# Patient Record
Sex: FEMALE
[De-identification: names, ages, dates, MRNs, and addresses within clinical notes are randomized per-mention and may not be internally consistent; named-entity substitution may affect disease eponyms.]

---

## 2021-06-10 ENCOUNTER — HOSPITAL ENCOUNTER (INPATIENT)
Dept: HOSPITAL 41 - JD.OB | Age: 31
LOS: 6 days | Discharge: HOME | DRG: 540 | End: 2021-06-16
Attending: OBSTETRICS & GYNECOLOGY | Admitting: OBSTETRICS & GYNECOLOGY
Payer: COMMERCIAL

## 2021-06-10 DIAGNOSIS — Z3A.40: ICD-10-CM

## 2021-06-10 DIAGNOSIS — D62: ICD-10-CM

## 2021-06-10 DIAGNOSIS — J45.909: ICD-10-CM

## 2021-06-10 DIAGNOSIS — K21.9: ICD-10-CM

## 2021-06-10 DIAGNOSIS — Z20.822: ICD-10-CM

## 2021-06-10 DIAGNOSIS — E66.9: ICD-10-CM

## 2021-06-10 PROCEDURE — U0002 COVID-19 LAB TEST NON-CDC: HCPCS

## 2021-06-10 NOTE — PCM.LDHP
L&D History of Present Illness





- General


Date of Service: 06/10/21


Admit Problem/Dx: 


                           Admission Diagnosis/Problem











Admission Diagnosis/Problem    














06/10/21 07:53


Maulik is a 30-year-old  1 para 0 female admitted on 6/10/2021 at 40-0/7 

weeks gestational age with an FAITH of 6/10/2021 for induction of labor.


Source of Information: Patient


History Limitations: Reports: No Limitations





- History of Present Illness


Introduction:: 





Maulik is a 30-year-old  1 para 0 female admitted on 6/10/2021 at 40-0/7 

weeks gestational age with an FAITH of 6/10/2021 for induction of labor.  The 

procedure, process, expectations and limitations of induction of labor discussed

in detail with patient.  She appears understand and wishes to proceed.





OB/GYN history: Maulik is a  1 para 0.  She had menarche at age 11.  

Cycles regular.  Last menstrual period started on 9/3/2020 and was definite.  

Her pregnancy is dated by this last menstrual period and supported by at least 3

ultrasounds.  Patient had a positive hCG on 2020.  She is not using any 

birth control at the time of conception.  She has had no abnormal Pap smears or 

STIs in the past.





Prenatal course.  Initial first prenatal visit was with a very early ultrasound 

done on 10/26/2020 at 7-3/7 weeks gestational age.  She was seen on a very 

regular basis throughout the pregnancy.  Her vital signs remained stable 

throughout the pregnancy.  Her weight gain was from 250.2 to 270.8 pounds.  

Fundal height growth was appropriate.  Patient has a history of asthma which has

been relatively stable during the pregnancy.  She desires epidural.  She has a 

family history of factor V Leiden mutation but has tested negative for this.  

Her group B strep screen was negative.  Risk factors include increased weight 

and distance from the hospital as she lives in [place], [place].  Prequel 

noninvasive prenatal screen done after 10 weeks was negative for trisomy 18, 13 

and 21.  Gender identification consistent with female.  She did have a urinary 

tract infection during pregnancy.  Patient received her Tdap on 5/10/2021.  She 

is rubella immune.





Laboratory testing during pregnancy shows blood to be all positive with a 

negative antibody screen.  Hemoglobin is 12.7 g/dL and platelets are 338,000.  

She is rubella immune.  RPR is nonreactive.  Urine culture was negative.  

Hepatitis B surface antigen and HIV assays were both negative.  Chlamydia and 

gonorrhea tests were negative.  TSH done on 2021 prior to the pregnancy was

negative for abnormalities.  Second trimester labs showed a hemoglobin of 11.7 

g/dL.  She was started on ferrous sulfate 325 mg p.o. daily at that time.  Her 

platelet count was 307,000.  Her 1 hour GTT was normal.  RPR was nonreactive.  

Group B strep screen was negative.





Allergies: Seasonal allergies only





Medications:





1.  Omeprazole 40 mg p.o. daily as needed for dyspepsia





2.  Montelukast sodium 10 mg p.o. daily for asthma





3.  Prenatal vitamin 1 p.o. daily.





4.  Advair Diskus inhaler 250-50 mcg per dose inhaler nation aerosol Powder 

breath activated





5.  Ferrous sulfate 3 and 25 mg p.o. daily





6.  Zyrtec 10 mg p.o. daily.





Past medical history:





1.  Asthma.





2.  Seasonal allergies.





Past surgical history:





1.  [id number]





2.  ACL knee surgery 





Family history: Mother is alive and well.  Father is alive but with hypertension

on medications.  2 brothers alive and well but 1 with ADHD.  Maternal 

grandmother  secondary to COPD.  Maternal grandfather  secondary

to COPD.  Both were smokers.  Paternal grandmother alive and well but with 

hypertension.  Paternal grandfather  secondary to lung cancerwas a 

smoker.  There is a maternal family history of a maternal aunt and multiple 

cousins on her maternal side that have factor V Leiden mutation.  Patient has 

been evaluated with factor V Leiden evaluation it which has returned negative.  

There are no bleeding, pregnancy related problems in the family.  A maternal 

aunt was noted to have breast cancer in her 50s.





Social history: Patient is .  She is a [employer].  She lives in 

[place], [place].  She is a college graduate.   is [employer].  She does not

use any significant also alcohol, drugs or tobacco.





Review of systems: In general patient has no complaints.  Baby has been active.





Skin: Negative





Lungs: No infectious symptoms or shortness of breath





Cardiovascular: No chest pain or exercise intolerance





Breasts: No lumps, changes in size, pain, dimpling, discharge or axillary or 

supraclavicular concerns.  Changes present that are associated with pregnancy.





GI: Negative





: Pregnancy changes in body habitus noted.





Musculoskeletal: Negative





Neurological: Negative





Physical exam:





In general the patient is well-developed, well-nourished, pleasant female of 

stated age in no acute distress.  Blood pressure on last evaluation clinic was 

122/64.  Weight was 270.8 pounds with first prenatal weight noted at 250.2 

pounds.  Height is 5 feet 6 inches.  Prepregnancy body mass index was 39.5.





Skin is warm dry without lesions.





HEENT, neck and back within normal limits.





Lungs are clear with good breath sounds in all lung fields.





Cardiovascular exam shows regular and rhythm without murmurs.





Breast exam was done at first prenatal visit and found to be normal and is not 

repeated at the time of this admission.





Abdomen is gravid with last fundal height at 41 cm.  Baby in vertex presentation

by Leopold maneuvers and by cervical exam.





Genital per digital exam at last visit shows cervix to be 1 to 2 cm, 70% 

effaced, very soft, -3 station.





Extremities and neurological exam are grossly within normal limits.





H&P Review of Systems





- Review of Systems:


Review Of Systems: See Below





L&D Exam





- Exam


Exam: See Below





- Problem List


(1) 40 weeks gestation of pregnancy


SNOMED Code(s): 83364703


   ICD Code: Z3A.40 - 40 WEEKS GESTATION OF PREGNANCY   Status: Acute   





(2) Obesity (BMI 35.0-39.9 without comorbidity)


SNOMED Code(s): 632464566, 566702835


   ICD Code: E66.9 - OBESITY, UNSPECIFIED   Status: Acute   


Problem List Initiated/Reviewed/Updated: Yes


Assessment/Plan Comment:: 





1.  Maulik is a 30-year-old  1 para 0 female admitted on 6/10/2021 at 40-

0/7 weeks gestational age with an FAITH of 6/10/2021 for induction of labor.





2.  Risk factors for the pregnancy include increased distance from the hospital,

obesity, asthma





3.  Group B strep negative





4.  Patient plans to breast-feed.





5.  Patient desires epidural in labor





6.  Patient has received her Tdap during pregnancy.  She is rubella immune.





Plan:





1.  Cytotec cervical ripening initially: We will give 25 mcg dose first dose 

vaginally followed by at least 23 doses of 50 mcg vaginally on a every 3 hour 

basis.  Will monitor her asthma very closely.  She is to continue on her asthma 

medications at this time.  The risks of Cytotec with history of asthma are 

discussed with the patient.  She has consented to attempting Cytotec cervical 

ripening after discussion of potential risks and benefits in light of her 

history of asthma in pregnancy.  This cervical ripening will be followed by 

Pitocin induction with possible AROM as possible.





2.  IV access prior to Cytotec placement





3.  Electronic fetal monitoring of labor per protocol





4.  Epidural per patient desire for labor analgesia.





5.  Anticipate 





6.  Support breast-feeding decision





7.  Admission laboratory testing consist of Covid19 testing, CBC, RPR per 

protocol.

## 2021-06-13 RX ADMIN — EPHEDRINE SULFATE PRN MG: 50 INJECTION, SOLUTION INTRAVENOUS at 17:13

## 2021-06-13 RX ADMIN — Medication SCH MLS/HR: at 20:25

## 2021-06-13 RX ADMIN — EPHEDRINE SULFATE PRN MG: 50 INJECTION, SOLUTION INTRAVENOUS at 17:32

## 2021-06-13 RX ADMIN — EPHEDRINE SULFATE PRN MG: 50 INJECTION, SOLUTION INTRAVENOUS at 17:09

## 2021-06-13 RX ADMIN — Medication SCH MLS/HR: at 06:26

## 2021-06-13 RX ADMIN — Medication PRN ML: at 16:01

## 2021-06-13 NOTE — PCM.SN.2
- Free Text/Narrative


Note: 





Progress note:





Patient was found to be 2 cm, 80% effaced, -3 station, cephalic presentation, 

mid position on last evaluation last evening.  Patient did not tolerate the exam

well for a number of reasons.  Soft tissue presents secondary to patient's body 

habitus seems to be associated with the very difficult nature of her exam.  With

Pitocin increased to 30milliunits/min patient did achieve a good labor pattern 

with moderate contractions every 3 minutes.





Hospital course.  Last evening the Pitocin was discontinued to allow patient to 

rest.  At approximately 0100 hrs. on 2021 patient was given a dose of 

Cytotec 25 mcg vaginally.  At 4:00 she is given a second dose of [id number] mcg.

 With this she has had some uterine irritability.  Fetal heart tones are 

reassuring.  No formal contractions are seen.





Assessment:





1.  40-3/7-week intrauterine pregnancy, slow progression of cervical dilation.





2.  Difficult and uncomfortable exam secondary to soft tissue 

dystocia/discomfort secondary to patient's body habitus





3.  Generally reassuring  testing





4.  Patient desiring epidural at some point





Plan:





1.  We will wait with any further cervical exams or attempts at rupture 

membranes until after patient is in a good labor pattern and has obtained 

epidural analgesia.





2.  Continue intermittent monitoring of fetal heart tones





3.  Reassurance

## 2021-06-13 NOTE — PCM.SN.2
- Free Text/Narrative


Note: 


Procedure: PIV start


Start: 1712


Stop: 1732





Called by OB RN to start PIV. She attempted to place x2 without success. U

ltrasound utilized with successful attempt with long 20 phyllis to right upper arm.

Area localized with 0.2 ml of 1% lidocaine. Patient tolerated well. 





Amina Arauz CRNA

## 2021-06-14 PROCEDURE — 3E0P7VZ INTRODUCTION OF HORMONE INTO FEMALE REPRODUCTIVE, VIA NATURAL OR ARTIFICIAL OPENING: ICD-10-PCS | Performed by: OBSTETRICS & GYNECOLOGY

## 2021-06-14 PROCEDURE — 10H07YZ INSERTION OF OTHER DEVICE INTO PRODUCTS OF CONCEPTION, VIA NATURAL OR ARTIFICIAL OPENING: ICD-10-PCS | Performed by: OBSTETRICS & GYNECOLOGY

## 2021-06-14 PROCEDURE — 3E0R3BZ INTRODUCTION OF ANESTHETIC AGENT INTO SPINAL CANAL, PERCUTANEOUS APPROACH: ICD-10-PCS | Performed by: OBSTETRICS & GYNECOLOGY

## 2021-06-14 RX ADMIN — OXYCODONE HYDROCHLORIDE AND ACETAMINOPHEN PRN TAB: 5; 325 TABLET ORAL at 18:05

## 2021-06-14 RX ADMIN — VITAMIN A, ASCORBIC ACID, CHOLECALCIFEROL, .ALPHA.-TOCOPHEROL ACETATE, DL-, THIAMINE MONONITRATE, RIBOFLAVIN, NIACINAMIDE, PYRIDOXINE HYDROCHLORIDE, FOLIC ACID, CYANOCOBALAMIN, CALCIUM CARBONATE, IRON, ZINC OXIDE, AND CUPRIC OXIDE SCH: 4000; 120; 400; 22; 1.84; 3; 20; 10; 1; 12; 200; 29; 25; 2 TABLET ORAL at 14:32

## 2021-06-14 RX ADMIN — Medication PRN ML: at 02:17

## 2021-06-14 NOTE — PCM.SN.2
- Free Text/Narrative


Note: 





Progress note:





Maulik has been in reasonably good labor objectively evaluated with internal 

pressure catheter for approximately the last 12 hours since SROM.  SROM occurred

with resultant clear amniotic fluid.  Fetal heart tones are reassuring.  Vital 

signs are stable.  Patient is comfortable with epidural in place and has been 

able to rest.





Evaluation patient cervix reveals 5 cm dilation, -3 station, 90% effacement, 

soft, mid position.  Patient tolerated exam well.  Patient still on Pitocin 

augmentation with reasonably strong contractions occurring every 3 minutes.





Assessment: 40-4/7-week intrauterine pregnancy, very slow progression of labor 

despite intrauterine pressure catheter objectively adequate labor. 





Plan:





1.  Monitor for just a short period time and if no progress is noted will 

consider a primary  section.  The procedure, risk, benefits, continued 

attempt at vaginal delivery all discussed with patient.  She appears understand 

and is in agreement.





2.   labs to be obtained





3.  Continue monitoring until final decision is made.

## 2021-06-14 NOTE — PCM.POSTAN
POST ANESTHESIA ASSESSMENT





- MENTAL STATUS


Mental Status: Alert, Oriented





- VITAL SIGNS


Vital Signs: 


                                Last Vital Signs











Temp  37.6 C   06/12/21 08:21


 


Pulse  96   06/12/21 08:21


 


Resp  14   06/12/21 08:21


 


BP  124/86   06/12/21 08:21


 


Pulse Ox      








0705


86/62


78


23


97.2


23





- RESPIRATORY


Respiratory Status: Respiratory Rate WNL, Airway Patent, O2 Saturation Stable





- CARDIOVASCULAR


CV Status: Pulse Rate WNL, Blood Pressure Stable





- GASTROINTESTINAL


GI Status: No Symptoms





- PAIN


Pain Score: 0





- POST OP HYDRATION


Hydration Status: Adequate & Stable

## 2021-06-14 NOTE — PCM.SN.2
- Free Text/Narrative


Note: 





I was called concerning post operative vaginal bleeding and uterine fundal 

height above the umbilicus.  Nurses expressed concern about potential retained 

clots leading to lack of contraction/involution of the uterus and continued 

postoperative bleeding after  section done this AM.  Patient had an 

extended labor.  She had reasonably good uterine contractility after delivery 

with the assistance of Methergine 0.2 mg IM and IV Pitocin.





On evaluation patient is having her blood drawn.  Her pulse was 122.  Blood 

pressure is good.  O2 saturations were 97-98% on room air.  Patient was feeling 

fine.  Was beginning to have some discomfort coming back after the epidural has 

begun wearing off.





Incision appears to be dry and intact.  No evidence of abdominal hematoma.  

Initially uterus was approximately 3 to 4 cm above the umbilicus.  Patient's 

evaluation was very difficult because of her body habitus and her increased 

weight.  Nurses reported she had probably about 900-1000 ml of blood loss in the

PACU and since returning to the floor.  A clot was previously expressed and she 

had some on bright red blood noted on repeated chux that were changed.





Vaginal exam was performed and with this couple clots were expressed.  Probably 

another 300 cc total.  With this uterine fundal height returned to about the 

level of the umbilicus.  Patient previously given 400 mcg of Cytotec buccally.





Assessment:





1.  Vaginal bleeding after  section this morning.  Uterine enlargement 

extending to above the umbilicus with reduction in size after expression of 

clots vaginally.  Findings are probably consistent with some uterine atony which

patient is at high risk for because of her extended labor.





2.  Prolonged labor course with induction over 2 days with resultant failure to 

progress and  done the same.





3.  Urine output was approximately 125-150 mL in the PACU.  Has been approximate

25 mL over the last hour upon return to the floor.  The blood is somewhat blood-

tinged.





4.  Patient's pain control is adequate at this time.





Plan:





1.  Tranexamic acid 1 g to be given IV over 10 minutes





2.  Methergine 0.2 mg IM to be given now





3.  Obtain labs consist of CBC, coags, comprehensive metabolic profile





4.  We will continue to monitor bleeding





5.  We will type, crossmatch and transfuse as indicated by blood count and 

continued bleeding





6.  If bleeding continues will consider doing an ultrasound to evaluate for 

other potential causes of blood loss such as a hematoma





7.  Continue monitoring urine output, vital signs and O2 sat.

## 2021-06-14 NOTE — PCM.OPNOTE
- General Post-Op/Procedure Note


Date of Surgery/Procedure: 21


Operative Procedure(s): Primary low uterine segment transverse  section 

through Pfannenstiel skin incision


Findings: 





Cervix was dilated to 5 cm, 80% effaced, -3 to -4 station, mid to anterior 

position, soft.  Baby is in vertex presentation.  Amniotic fluid was clear.  At 

the time of surgery uterus, tubes and ovaries were consistent with term 

pregnancy.  No abnormalities were noted.\\Baby was delivered at 0624 hrs.  Had 

Apgars of 8 and 9.  Weight was 4340 g (9 pounds 9 ounces), length was 21 inches.


Pre Op Diagnosis: 1.  40-4/7 weeks intrauterine pregnancy, failure to progress


Post-Op Diagnosis: Same with delivery of a macrosomic baby


Anesthesia Technique: Epidural


Other Anesthesia Type: Marcaine 0.5% - 20 cclocal


Primary Surgeon: Krish Angel


Secondary Surgeon: Susi Arauz


Anesthesia Provider: Jyotsna Lamb


Assistant: Amina Arauz (Nurse anesthetist)


Reason Assistant Was Necessary: 





Assistance, retraction, patient safety, quality of care.


Fluid Replacement, Intraop: 1,200


Output, Urine Amount: 100


EBL in mLs: 900


Drain/Tube Comments:: Indwelling bladder catheter


Complications: None


Condition: Good


Free Text/Narrative:: 


                                 Intake & Output











 21





 22:59 06:59 14:59


 


Intake Total 4000 2000 


 


Output Total    


 


Balance 4016 2000 








Surgery duration: 34 minutes








Surgery duration: 








Procedure:  The patient is appropriately consented. Patient was transferred to 

the  room and placed in a sitting position.  Epidural used for labor 

analgesia was used to obtain  anesthesia. After confirmation of 

adequate anesthesia patient was placed in a supine position with a wedge under 

her right side to facilitate left lateral positioning. The patient was prepped 

and draped in usual fashion after Hartman catheter was already placed . The 

anesthetic was checked and found to be adequate. 20 mL of Marcaine 0.5% was 

injected locally in the Pfannenstiel incision site. The Pfannenstiel skin 

incision was then made and carried down through skin, subcutaneous and fascial 

layers. The fascia was then undermined superiorly and inferiorly to allow for 

adequate operating room. The recti muscles  midline and preperitoneal 

fat was bluntly dissected. Peritoneal cavity was entered longitudinally. The 

vesicouterine peritoneum was then incised transversely and bladder flap was 

developed. Myometrium was incised transversely to the level of the amniotic sac.

This incision was extended bilaterally in a blunt fashion. The amniotic sac was 

then ruptured resulting in clear amniotic fluid. A hand is placed in the low 

uterine segment and the baby's head was brought forth through the incision. The 

baby was completely delivered using fundal pressure in a routine fashion. The 

nose and mouth were bulb suctioned. Baby's cord was clamped x2 cut and baby was 

handed off to attending pediatrician Dr aSleh. Placenta was expressed after cord 

blood was obtained. Uterus was then exteriorized to allow for easier closure. 

The cervix was assessed and found to be dilated adequately to allow egress of 

blood. The uterus was closed in 2 layers. The first layer a running locked 

suture of 0 Monocryl, the second layer a running horizontal mattress suture of 0

Monocryl. Figure-of-eight suture was placed at mid incision and at the left end 

of the uterine incision to control 2 bleeders. Hemostasis confirmed at this 

time. Sponge, instrument and needle counts are correct.  The patient was given 

Methergine 0.2 mg IM to facilitate increase in uterine tone and decrease 

likelihood of bleeding.  The uterus was returned to the abdominal cavity and 

lateral gutters were cleared of blood. Once again sponge needle counts are 

correct. The anterior abdominal wall was closed with a #1 PDS suture from angle 

to angle. The subcutaneous area was found to be free of any bleeders. 

interrupted sutures of 3-0 Monocryl were used to reapproximate the subcutaneous 

layer.Skin was closed with a running subcuticular stitch of 3-0 Monocryl in a 

vertical mattress suture fashion using a Steven needle. Prineo mesh/glue was then

applied to further approximate the incision.





It should be noted that patient received 3 g of Ancef preoperatively for 

infection prophylaxis and had Pitocin infused after delivery of the placenta to 

facilitate uterine contraction. She also had sequential compression stockings in

place for DVT prophylaxis. Patient was discharged from the operating room in 

satisfactory condition.

## 2021-06-14 NOTE — PCM.PREANE
Preanesthetic Assessment





- Procedure


Proposed Procedure: 





Labor Epidural





- Anesthesia/Transfusion/Family Hx


Anesthesia History: Prior Anesthesia Without Reaction


Family History of Anesthesia Reaction: No


Transfusion History: No Prior Transfusion(s)





- Review of Systems


General: No Symptoms


Pulmonary: Other (Asthma controlled with advair. )


Cardiovascular: No Symptoms


Gastrointestinal: No Symptoms


Neurological: No Symptoms


Other: Reports: None (Obesity BMI 44)





- Physical Assessment


Vital Signs: 





                                Last Vital Signs











Temp  37.6 C   06/12/21 08:21


 


Pulse  96   06/12/21 08:21


 


Resp  14   06/12/21 08:21


 


BP  124/86   06/12/21 08:21


 


Pulse Ox      











Height: 1.68 m


Weight: 123.559 kg


ASA Class: 3


Mental Status: Alert & Oriented x3


Airway Class: Mallampati = 1


Dentition: Reports: Normal Dentition


Thyro-Mental Finger Breadths: 3


Mouth Opening Finger Breadths: 3


ROM/Head Extension: Full


Lungs: Clear to Auscultation, Normal Respiratory Effort


Cardiovascular: Regular Rate, Regular Rhythm





- Lab


Values: 





                             Laboratory Last Values











WBC  12.27 K/mm3 (3.98-10.04)  H  06/12/21  08:32    


 


RBC  4.46 M/mm3 (3.98-5.22)   06/12/21  08:32    


 


Hgb  13.1 gm/dl (11.2-15.7)   06/12/21  08:32    


 


Hct  39.8 % (34.1-44.9)   06/12/21  08:32    


 


MCV  89.2 fl (79.4-94.8)   06/12/21  08:32    


 


MCH  29.4 pg (25.6-32.2)   06/12/21  08:32    


 


MCHC  32.9 g/dl (32.2-35.5)   06/12/21  08:32    


 


RDW Std Deviation  50.5 fL (36.4-46.3)  H  06/12/21  08:32    


 


Plt Count  284 K/mm3 (182-369)   06/12/21  08:32    


 


MPV  10.1 fl (9.4-12.3)   06/12/21  08:32    


 


Neut % (Auto)  76.4 % (34.0-71.1)  H  06/12/21  08:32    


 


Lymph % (Auto)  14.5 % (19.3-51.7)  L  06/12/21  08:32    


 


Mono % (Auto)  6.7 % (4.7-12.5)   06/12/21  08:32    


 


Eos % (Auto)  1.3  (0.7-5.8)   06/12/21  08:32    


 


Baso % (Auto)  0.2 % (0.1-1.2)   06/12/21  08:32    


 


Neut # (Auto)  9.38 K/mm3 (1.56-6.13)  H  06/12/21  08:32    


 


Lymph # (Auto)  1.78 K/mm3 (1.18-3.74)   06/12/21  08:32    


 


Mono # (Auto)  0.82 K/mm3 (0.24-0.36)  H  06/12/21  08:32    


 


Eos # (Auto)  0.16 K/mm3 (0.04-0.36)   06/12/21  08:32    


 


Baso # (Auto)  0.02 K/mm3 (0.01-0.08)   06/12/21  08:32    


 


Manual Slide Review  Abnormal smear   06/12/21  08:32    


 


RPR  Non-reactive  (NONREACTIVE)   06/12/21  08:32    


 


SARS-CoV-2 RNA (JACQUIE)  Negative  (NEGATIVE)   06/12/21  07:50    


 


Blood Type  O POSITIVE   06/14/21  05:19    


 


Gel Antibody Screen  Negative   06/14/21  05:19    














- Allergies


Allergies/Adverse Reactions: 


                                    Allergies











Allergy/AdvReac Type Severity Reaction Status Date / Time


 


No Known Allergies Allergy   Verified 06/12/21 08:24














- Acknowledgements


Anesthesia Type Planned: Epidural


Pt an Appropriate Candidate for the Planned Anesthesia: Yes


Alternatives and Risks of Anesthesia Discussed w Pt/Guardian: Yes


Pt/Guardian Understands and Agrees with Anesthesia Plan: Yes





PreAnesthesia Questionnaire


Respiratory History: Reports: Asthma


Gastrointestinal History: Reports: GERD


OB/GYN History: Reports: Pregnancy





- Past Surgical History


GI Surgical History: Reports: Cholecystectomy


Musculoskeletal Surgical History: Reports: Other (See Below)


Other Musculoskeletal Surgeries/Procedures:: Lt ACL repair





- SUBSTANCE USE


Tobacco Use Status *Q: Never Tobacco User


Tobacco Use Within Last Twelve Months: No


Second Hand Smoke Exposure: No


Recreational Drug Use History: No





- HOME MEDS


Home Medications: 


                                    Home Meds





Albuterol Sulfate [Albuterol Sulfate HFA] 1 puff INH ASDIRECTED PRN 06/12/21 

[History]


Cetirizine HCl [Zyrtec] 10 mg PO DAILY 06/12/21 [History]


Ferrous Sulfate [Iron] 325 mg PO DAILY 06/12/21 [History]


Fluconazole 1 tab PO DAILY MDD 3 06/12/21 [History]


Fluticasone Propion/Salmeterol [Advair 250-50 Diskus] 1 spray NASBOTH DAILY 

06/12/21 [History]


Montelukast Sodium 10 mg PO DAILY 06/12/21 [History]


Omeprazole 1 cap PO DAILY 06/12/21 [History]


Prenatal No122/Iron/Folic Acid [Prenatal Multi Tablet] 1 tab PO DAILY 06/12/21 

[History]











- CURRENT (IN HOUSE) MEDS


Current Meds: 





                               Current Medications





Acetaminophen (Acetaminophen 325 Mg Tab)  650 mg PO Q4H PRN


   PRN Reason: Pain (Mild 1-3) and fever


Calcium Carbonate/Glycine (Calcium Carbonate 500 Mg Tab.Chew)  1,000 mg PO Q2H 

PRN


   PRN Reason: Indigestion


   Last Admin: 06/13/21 01:22 Dose:  1,000 mg


   Documented by: 


Diphenhydramine HCl (Diphenhydramine 50 Mg/Ml Sdv)  25 mg IVPUSH Q6H PRN


   PRN Reason: pruritis


   Last Admin: 06/14/21 00:59 Dose:  25 mg


   Documented by: 


Diphenhydramine HCl (Diphenhydramine 50 Mg/Ml Sdv)  25 mg IVPUSH Q6H PRN


   PRN Reason: Pruritis


Ephedrine Sulfate (Ephedrine 50 Mg/Ml Sdv)  5 mg IVPUSH ASDIRECTED PRN


   PRN Reason: Hypotension


   Last Admin: 06/13/21 17:32 Dose:  10 mg


   Documented by: 


Fentanyl (Fentanyl 100 Mcg/2 Ml Sdv)  100 mcg EPIDUR Q3H PRN


   PRN Reason: Pain


   Last Admin: 06/13/21 16:00 Dose:  100 mcg


   Documented by: 


Fentanyl (Fentanyl 100 Mcg/2 Ml Sdv)  50 mcg IVPUSH Q5M PRN


   PRN Reason: Pain


Fentanyl/Bupivacaine HCl (Bupivacaine/Fentanyl/Ns 100 Ml Bag)  100 ml EPIDUR 

ASDIRECTED PRN


   PRN Reason: Pain


   Last Admin: 06/14/21 02:17 Dose:  100 ml


   Documented by: 


Oxytocin/Lactated Ringer's (Pitocin In Lr 10 Units/1,000 Ml)  10 unit in 1,000 

mls @ 100 mls/hr IV .CONTINUOUS LOIS; Protocol


Lactated Ringer's (Ringers, Lactated)  1,000 mls @ 100 mls/hr IV ASDIRECTED LOIS


   Last Admin: 06/14/21 05:54 Dose:  100 mls/hr


   Documented by: 


Oxytocin/Lactated Ringer's (Pitocin In Lr 20 Units/1,000 Ml)  20 unit in 1,000 

mls @ 6 mls/hr IV TITRATE LOIS; Protocol


   Last Admin: 06/13/21 20:25 Dose:  90 mls/hr


   Documented by: 


Azithromycin 500 mg/ Sodium (Chloride)  250 mls @ 250 mls/hr IV ONETIME STA


   Stop: 06/14/21 06:33


   Last Admin: 06/14/21 05:57 Dose:  250 mls/hr


   Documented by: 


Lidocaine HCl (Lidocaine 1% 50 Ml Mdv)  50 ml INJECT ONETIME LOIS


Nalbuphine HCl (Nalbuphine 10 Mg/1 Ml Vial)  10 mg IVPUSH Q2H PRN


   PRN Reason: Pain


Ondansetron HCl (Ondansetron 4 Mg/2 Ml Sdv)  4 mg IVPUSH Q4H PRN


   PRN Reason: Nausea/Vomiting


   Last Admin: 06/13/21 17:45 Dose:  4 mg


   Documented by: 


Ondansetron HCl (Ondansetron 4 Mg/2 Ml Sdv)  4 mg IVPUSH ONETIME PRN


   PRN Reason: Nausea/Vomiting


Sodium Chloride (Sodium Chloride 0.9% 10 Ml Syringe)  10 ml FLUSH ASDIRECTED PRN


   PRN Reason: Keep Vein Open





Discontinued Medications





Bupivacaine HCl (Bupivacaine 0.5% 30 Ml Sdv) Confirm Administered Dose 30 ml 

.ROUTE .STK-MED ONE


   Stop: 06/14/21 06:00


Cefazolin Sodium (Cefazolin 1 Gm Vial) Confirm Administered Dose 3 gm .ROUTE 

.STK-MED ONE


   Stop: 06/14/21 05:47


Citric Acid/Sodium Citrate (Citric Acid/Sodium Citrate Solution 30 Ml Cup) Confi

rm Administered Dose 30 ml .ROUTE .STK-MED ONE


   Stop: 06/14/21 05:44


Citric Acid/Sodium Citrate (Citric Acid/Sodium Citrate Solution 30 Ml Cup)  30 

ml PO ONETIME ONE


   Stop: 06/14/21 05:35


   Last Admin: 06/14/21 05:55 Dose:  30 ml


   Documented by: 


Fentanyl (Fentanyl 100 Mcg/2 Ml Sdv) Confirm Administered Dose 100 mcg .ROUTE 

.STK-MED ONE


   Stop: 06/14/21 05:51


Oxytocin/Lactated Ringer's (Pitocin In Lr 10 Units/1,000 Ml)  10 unit in 1,000 

mls @ 12 mls/hr IV TITRATE LOIS; Protocol


   Last Titration: 06/12/21 17:59 Dose:  30 munits/min, 180 mls/hr


   Documented by: 


Oxytocin/Lactated Ringer's (Pitocin In Lr 20 Units/1,000 Ml)  20 unit in 1,000 

mls @ 90 mls/hr IV TITRATE LOIS; Protocol


   Last Admin: 06/12/21 18:36 Dose:  90 mls/hr


   Documented by: 


Cefazolin Sodium/Dextrose 2 gm (/ Premix)  50 mls @ 100 mls/hr IV ONETIME ONE


   Stop: 06/14/21 06:03


Cefazolin Sodium/Dextrose 1 gm (/ Premix)  50 mls @ 100 mls/hr IV ONETIME ONE


   Stop: 06/14/21 06:03


Ketorolac Tromethamine (Ketorolac 30 Mg/Ml Sdv) Confirm Administered Dose 30 mg 

.ROUTE .STK-MED ONE


   Stop: 06/14/21 05:47


Lidocaine/Epinephrine (Lidocaine 2% With Epinephrine 1:200,000 20 Ml Sdv) 

Confirm Administered Dose 20 ml .ROUTE .STK-MED ONE


   Stop: 06/14/21 05:47


Metoclopramide HCl (Metoclopramide 10 Mg/2 Ml Sdv) Confirm Administered Dose 10 

mg .ROUTE .STK-MED ONE


   Stop: 06/14/21 05:44


Metoclopramide HCl (Metoclopramide 10 Mg/2 Ml Sdv)  10 mg IVPUSH ONETIME ONE


   Stop: 06/14/21 05:35


   Last Admin: 06/14/21 05:55 Dose:  10 mg


   Documented by: 


Misoprostol (Misoprostol 25 Mcg (1/4 Of 100 Mcg) Tab)  25 mcg VAG ONETIME ONE


   Stop: 06/13/21 01:01


   Last Admin: 06/13/21 01:14 Dose:  25 mcg


   Documented by: 


Misoprostol (Misoprostol 25 Mcg (1/4 Of 100 Mcg) Tab)  50 mcg VAG ONETIME ONE


   Stop: 06/13/21 04:01


   Last Admin: 06/13/21 04:15 Dose:  50 mcg


   Documented by: 


Ondansetron HCl (Ondansetron 4 Mg/2 Ml Sdv) Confirm Administered Dose 4 mg 

.ROUTE .STK-MED ONE


   Stop: 06/14/21 05:47


Oxytocin (Oxytocin 10 Units/1 Ml Sdv) Confirm Administered Dose 10 unit .ROUTE 

.STK-MED ONE


   Stop: 06/14/21 05:47


Oxytocin (Oxytocin 10 Units/1 Ml Sdv) Confirm Administered Dose 10 unit .ROUTE 

.STK-MED ONE


   Stop: 06/14/21 05:50

## 2021-06-14 NOTE — US
Pelvic ultrasound: Multiple real-time images were obtained 

transabdominally.

 

Comparison: No previous pelvic ultrasound is available.

 

Study is somewhat suboptimal secondary to the patient body habitus.

 

Mixed echogenic debris is seen within a distended endometrial cavity. 

 Findings presumably represent blood clot.

 

Uterine measurements are length 19.8 cm, AP height 8.8 cm, transverse 

width 9.2 cm.

 

Ovaries are not visualized.  No free fluid is seen.

 

Impression:

1.  Distended endometrial cavity containing mixed echogenic debris 

which is most likely from blood clot.

 

Diagnostic code #3

## 2021-06-15 RX ADMIN — VITAMIN A, ASCORBIC ACID, CHOLECALCIFEROL, .ALPHA.-TOCOPHEROL ACETATE, DL-, THIAMINE MONONITRATE, RIBOFLAVIN, NIACINAMIDE, PYRIDOXINE HYDROCHLORIDE, FOLIC ACID, CYANOCOBALAMIN, CALCIUM CARBONATE, IRON, ZINC OXIDE, AND CUPRIC OXIDE SCH: 4000; 120; 400; 22; 1.84; 3; 20; 10; 1; 12; 200; 29; 25; 2 TABLET ORAL at 11:17

## 2021-06-15 RX ADMIN — OXYCODONE HYDROCHLORIDE AND ACETAMINOPHEN PRN TAB: 5; 325 TABLET ORAL at 21:14

## 2021-06-15 RX ADMIN — OXYCODONE HYDROCHLORIDE AND ACETAMINOPHEN PRN TAB: 5; 325 TABLET ORAL at 17:01

## 2021-06-15 NOTE — PCM.SN.[email or web address]
- Free Text/Narrative


Note: 





Post Operative Progress Note


POD #1





Subjective:


Doing well overall this morning.  Reports that she was having some episodes of 

feeling warm and feeling feverish as well as chills.  Reports that it started 

several hours after taking the Cytotec for uterine tone.  Reports that the 

symptoms have improved as she has gotten further from the last dose of Cytotec. 

Ambulating without difficulty.  Lochia minimal.  Hartman catheter was removed this

morning and has felt like she needed to urinate but was unable to void.  Passing

flatus.  Tolerating regular diet without nausea or vomiting.  Pain controlled 

with oral medications.  Bottlefeeding with minimal difficulty.  Denies any milk 

production at this time.  Denies any lightheadedness, dizziness or shortness of 

breath with ambulation.





Objective: 


Vitals:


                               Vital Signs - 24 hr











  21





  09:15 09:18 10:21


 


Temperature   37.1 C


 


Pulse, 129 H 126 H 101 H





Peripheral   


 


Respiratory   16





Rate   


 


Blood Pressure  100/60 128/95 H


 


O2 Sat by Pulse 96 96 100





Oximetry   














  21





  11:04 11:30 13:03


 


Temperature 36.8 C  36.4 C


 


Pulse, 93 113 H 109 H





Peripheral   


 


Respiratory 16  16





Rate   


 


Blood Pressure 114/75  113/74


 


O2 Sat by Pulse 99 99 100





Oximetry   














  21





  16:06 20:00 20:30


 


Temperature 36.9 C  36.9 C


 


Pulse, 76  82





Peripheral   


 


Respiratory 16 16 16





Rate   


 


Blood Pressure 102/61  106/58 L


 


O2 Sat by Pulse 100 99 99





Oximetry   














Physical Exam


General: Alert and oriented, no acute distress


Lungs: Clear to auscultation bilaterally


Heart: Regular rate and rhythm


Abdomen: Soft, minimal appropriate tenderness, non-distended, fundus midline, 

nontender and at the umbilicus


Incision: Clean, dry and intact, no erythema, bleeding or drainage with Prineo 

dressing in place


Extremities: No edema in bilateral lower extremities, no calf tenderness 

bilaterally





Labs: 


 


                         Laboratory Results - last 24 hr











  21 Range/Units





  08:32 09:06 09:06 


 


WBC   25.25 H   (3.98-10.04)  K/mm3


 


RBC   3.51 L   (3.98-5.22)  M/mm3


 


Hgb   10.3 L D   (11.2-15.7)  gm/dl


 


Hct   31.8 L   (34.1-44.9)  %


 


MCV   90.6   (79.4-94.8)  fl


 


MCH   29.3   (25.6-32.2)  pg


 


MCHC   32.4   (32.2-35.5)  g/dl


 


RDW Std Deviation   50.9 H   (36.4-46.3)  fL


 


Plt Count   271   (182-369)  K/mm3


 


MPV   10.3   (9.4-12.3)  fl


 


Neut % (Auto)   92.1 H   (34.0-71.1)  %


 


Lymph % (Auto)   3.5 L   (19.3-51.7)  %


 


Mono % (Auto)   3.8 L   (4.7-12.5)  %


 


Eos % (Auto)   0 L   (0.7-5.8)  


 


Baso % (Auto)   0.1   (0.1-1.2)  %


 


Neut # (Auto)   23.26 H   (1.56-6.13)  K/mm3


 


Lymph # (Auto)   0.89 L   (1.18-3.74)  K/mm3


 


Mono # (Auto)   0.95 H   (0.24-0.36)  K/mm3


 


Eos # (Auto)   0.01 L   (0.04-0.36)  K/mm3


 


Baso # (Auto)   0.02   (0.01-0.08)  K/mm3


 


Manual Slide Review   Abnormal smear   


 


PT     (9.7-12.0)  SECONDS


 


INR     


 


APTT     (21.7-31.4)  SECONDS


 


Sodium    137  (136-145)  mEq/L


 


Potassium    4.3  (3.5-5.1)  mEq/L


 


Chloride    106  ()  mEq/L


 


Carbon Dioxide    19 L  (21-32)  mEq/L


 


Anion Gap    16.3 H  (5-15)  


 


BUN    11  (7-18)  mg/dL


 


Creatinine    1.0  (0.55-1.02)  mg/dL


 


Est Cr Clr Drug Dosing    76.31  mL/min


 


Estimated GFR (MDRD)    > 60  (>60)  mL/min


 


BUN/Creatinine Ratio    11.0 L  (14-18)  


 


Glucose    99  (70-99)  mg/dL


 


Calcium    8.1 L  (8.5-10.1)  mg/dL


 


Total Bilirubin    0.6  (0.2-1.0)  mg/dL


 


AST    16  (15-37)  U/L


 


ALT    20  (14-59)  U/L


 


Alkaline Phosphatase    147 H  ()  U/L


 


Total Protein    4.9 L  (6.4-8.2)  g/dl


 


Albumin    1.8 L  (3.4-5.0)  g/dl


 


Globulin    3.1  gm/dL


 


Albumin/Globulin Ratio    0.6 L  (1-2)  


 


Hepatitis C Antibody  <0.1    (0.0-0.9)  s/co ratio














  21 Range/Units





  09:06 09:06 16:30 


 


WBC    24.45 H  (3.98-10.04)  K/mm3


 


RBC    2.79 L  (3.98-5.22)  M/mm3


 


Hgb    8.2 L D  (11.2-15.7)  gm/dl


 


Hct    25.5 L  (34.1-44.9)  %


 


MCV    91.4  (79.4-94.8)  fl


 


MCH    29.4  (25.6-32.2)  pg


 


MCHC    32.2  (32.2-35.5)  g/dl


 


RDW Std Deviation    49.7 H  (36.4-46.3)  fL


 


Plt Count    232  (182-369)  K/mm3


 


MPV    10.1  (9.4-12.3)  fl


 


Neut % (Auto)    87.2 H  (34.0-71.1)  %


 


Lymph % (Auto)    5.8 L  (19.3-51.7)  %


 


Mono % (Auto)    6.4  (4.7-12.5)  %


 


Eos % (Auto)    0 L  (0.7-5.8)  


 


Baso % (Auto)    0.1  (0.1-1.2)  %


 


Neut # (Auto)    21.32 H  (1.56-6.13)  K/mm3


 


Lymph # (Auto)    1.42  (1.18-3.74)  K/mm3


 


Mono # (Auto)    1.57 H  (0.24-0.36)  K/mm3


 


Eos # (Auto)    0.00 L  (0.04-0.36)  K/mm3


 


Baso # (Auto)    0.02  (0.01-0.08)  K/mm3


 


Manual Slide Review    Abnormal smear  


 


PT  10.6    (9.7-12.0)  SECONDS


 


INR  0.99    


 


APTT   31.1   (21.7-31.4)  SECONDS


 


Sodium     (136-145)  mEq/L


 


Potassium     (3.5-5.1)  mEq/L


 


Chloride     ()  mEq/L


 


Carbon Dioxide     (21-32)  mEq/L


 


Anion Gap     (5-15)  


 


BUN     (7-18)  mg/dL


 


Creatinine     (0.55-1.02)  mg/dL


 


Est Cr Clr Drug Dosing     mL/min


 


Estimated GFR (MDRD)     (>60)  mL/min


 


BUN/Creatinine Ratio     (14-18)  


 


Glucose     (70-99)  mg/dL


 


Calcium     (8.5-10.1)  mg/dL


 


Total Bilirubin     (0.2-1.0)  mg/dL


 


AST     (15-37)  U/L


 


ALT     (14-59)  U/L


 


Alkaline Phosphatase     ()  U/L


 


Total Protein     (6.4-8.2)  g/dl


 


Albumin     (3.4-5.0)  g/dl


 


Globulin     gm/dL


 


Albumin/Globulin Ratio     (1-2)  


 


Hepatitis C Antibody     (0.0-0.9)  s/co ratio














  06/15/21 Range/Units





  05:44 


 


WBC  17.44 H  (3.98-10.04)  K/mm3


 


RBC  2.71 L  (3.98-5.22)  M/mm3


 


Hgb  7.9 L  (11.2-15.7)  gm/dl


 


Hct  25.0 L  (34.1-44.9)  %


 


MCV  92.3  (79.4-94.8)  fl


 


MCH  29.2  (25.6-32.2)  pg


 


MCHC  31.6 L  (32.2-35.5)  g/dl


 


RDW Std Deviation  51.1 H  (36.4-46.3)  fL


 


Plt Count  246  (182-369)  K/mm3


 


MPV  10.1  (9.4-12.3)  fl


 


Neut % (Auto)  79.2 H  (34.0-71.1)  %


 


Lymph % (Auto)  14.4 L  (19.3-51.7)  %


 


Mono % (Auto)  5.6  (4.7-12.5)  %


 


Eos % (Auto)  0.7  (0.7-5.8)  


 


Baso % (Auto)  0.1  (0.1-1.2)  %


 


Neut # (Auto)  13.79 H  (1.56-6.13)  K/mm3


 


Lymph # (Auto)  2.52  (1.18-3.74)  K/mm3


 


Mono # (Auto)  0.98 H  (0.24-0.36)  K/mm3


 


Eos # (Auto)  0.13  (0.04-0.36)  K/mm3


 


Baso # (Auto)  0.02  (0.01-0.08)  K/mm3


 


Manual Slide Review  Abnormal smear  


 


PT   (9.7-12.0)  SECONDS


 


INR   


 


APTT   (21.7-31.4)  SECONDS


 


Sodium   (136-145)  mEq/L


 


Potassium   (3.5-5.1)  mEq/L


 


Chloride   ()  mEq/L


 


Carbon Dioxide   (21-32)  mEq/L


 


Anion Gap   (5-15)  


 


BUN   (7-18)  mg/dL


 


Creatinine   (0.55-1.02)  mg/dL


 


Est Cr Clr Drug Dosing   mL/min


 


Estimated GFR (MDRD)   (>60)  mL/min


 


BUN/Creatinine Ratio   (14-18)  


 


Glucose   (70-99)  mg/dL


 


Calcium   (8.5-10.1)  mg/dL


 


Total Bilirubin   (0.2-1.0)  mg/dL


 


AST   (15-37)  U/L


 


ALT   (14-59)  U/L


 


Alkaline Phosphatase   ()  U/L


 


Total Protein   (6.4-8.2)  g/dl


 


Albumin   (3.4-5.0)  g/dl


 


Globulin   gm/dL


 


Albumin/Globulin Ratio   (1-2)  


 


Hepatitis C Antibody   (0.0-0.9)  s/co ratio














ASSESSMENT:


31-year-old female -0-0-1 s/p primary  section POD #1 for arrest of

dilation at 5 cm, complicated by postpartum hemorrhage with EBL of approximately

2500 mL, asthma and obesity





PLAN: 


* Doing well overall this morning


* No evidence of significant symptoms from her postpartum hemorrhage.  Her 

  hemoglobin this morning was 7.9 which is stable from yesterday afternoon when 

  it was 8.2.  Patient is not having any symptoms from this blood loss following

  her  section.  Continue to monitor her blood loss and symptoms 

  closely.


* Bottlefeeding with minimal difficulty. Assist as needed


* Incision healing well. Continue to keep clean and dry.


* Lochia minimal. Continue to monitor for appropriate lochia.


* Continue routine post-operative care


* Monitor for signs and symptoms of endometritis due to the clearing of clots 

  from the lower uterine segment.  Suspect that her fever overnight and this 

  morning was due to the Cytotec that was given for uterine tone.  Cytotec 

  discontinued this morning due to ongoing good uterine tone overnight and side 

  effects from the medication.


* Anticipate discharge home tomorrow if patient continues to improve and infant 

  is stable for discharge








Rene Padilla MD


9:05 AM


6/15/2021

## 2021-06-15 NOTE — [id number]
Post Anesthesia Note





- EVALUATION WITHIN 48HRS OF ANESTHETIC


Vital Signs in Normal Range: Yes


Patient Participated in Evaluation: Yes


Respiratory Function Stable: Yes


Airway Patent: Yes


Cardiovascular Function Stable: Yes


Hydration Status Stable: Yes


Pain Control Satisfactory: Yes


Nausea and Vomiting Control Satisfactory: Yes


Mental Status Recovered: Yes


Vital Signs: 


                                Last Vital Signs











Temp  98.4 F   06/14/21 20:30


 


Pulse  82   06/14/21 20:30


 


Resp  16   06/15/21 07:00


 


BP  106/58 L  06/14/21 20:30


 


Pulse Ox  98   06/15/21 07:00














- COMMENTS/OBSERVATIONS


Free Text/Narrative:: 





Patient denying anesthetic complications.

## 2021-06-16 RX ADMIN — OXYCODONE HYDROCHLORIDE AND ACETAMINOPHEN PRN TAB: 5; 325 TABLET ORAL at 11:23

## 2021-06-16 RX ADMIN — OXYCODONE HYDROCHLORIDE AND ACETAMINOPHEN PRN TAB: 5; 325 TABLET ORAL at 06:10

## 2021-06-16 RX ADMIN — VITAMIN A, ASCORBIC ACID, CHOLECALCIFEROL, .ALPHA.-TOCOPHEROL ACETATE, DL-, THIAMINE MONONITRATE, RIBOFLAVIN, NIACINAMIDE, PYRIDOXINE HYDROCHLORIDE, FOLIC ACID, CYANOCOBALAMIN, CALCIUM CARBONATE, IRON, ZINC OXIDE, AND CUPRIC OXIDE SCH: 4000; 120; 400; 22; 1.84; 3; 20; 10; 1; 12; 200; 29; 25; 2 TABLET ORAL at 09:46

## 2021-06-16 RX ADMIN — OXYCODONE HYDROCHLORIDE AND ACETAMINOPHEN PRN TAB: 5; 325 TABLET ORAL at 01:20

## 2021-06-16 NOTE — [id number]
**Discharge Summary





- Hospital Course


Free Text/Narrative:: 





Maulik is a 31-year-old  1 now para 1-0-0-1 white female who was admitted

on 2021 for elective induction of labor at 40-2/7 weeks gestational age.  

She underwent a very long induction of labor and then failed to progress after 

around 5 cm.  Please see admission history and physical and progress notes for 

details concerning hospital course.  Patient was taken to surgery on 2021. 

Please see operative report for details.  She underwent a primary low segment 

transverse  section through Pfannenstiel skin incision.  Findings at the

time of delivery were as follows:  Baby is in vertex presentation.  Amniotic 

fluid was clear.  At the time of surgery uterus, tubes and ovaries were 

consistent with term pregnancy.  No abnormalities were noted.\\Baby was 

delivered at 0624 hrs.  Had Apgars of 8 and 9.  Weight was 4340 g (9 pounds 9 ou

nces), length was 21 inches.  She had approximately 900-1000 cc of blood loss at

the time of surgery.  In the next 24 hours she continued to have an increased 

rate of flow and over the course of the first 36 hours after surgery had 

approximately another 1200 cc.  Serial CBCs done showed a hemoglobin to decrease

to 7.9 mg/dL.  Patient is doing well with this however she is ambulating without

problems, feels somewhat tired but has no near syncopal episodes.  Her vital 

signs are stable.  She is afebrile.  White count was elevated at time prior to 

her  but has slowly come down and she is showing no signs of infection 

at this time.  She is breast-feeding without problems.  Incision appears to be 

healing well.  She is desiring discharge home.


Diagnosis: Stroke: No





- Discharge Data


Discharge Date: 21


Discharge Disposition: Home, Self-Care 01


Condition: Good





- Referral to Home Health


Primary Care Physician: 


Krish Angel MD








- Discharge Diagnosis/Problem(s)


(1) 40 weeks gestation of pregnancy


SNOMED Code(s): 25737593


   ICD Code: Z3A.40 - 40 WEEKS GESTATION OF PREGNANCY   Status: Acute   





(2) Obesity (BMI 35.0-39.9 without comorbidity)


SNOMED Code(s): 534697862, 409553788


   ICD Code: E66.9 - OBESITY, UNSPECIFIED   Status: Acute   





- Patient Summary/Data


Operative Procedure(s) Performed: Primary low uterine segment transverse 

 section through Pfannenstiel skin incision





- Patient Instructions


Diet: Regular Diet as Tolerated (Nursing diet with increased calories and 

calcium as recommended)


Activity: As Tolerated (No lifting greater than 15 pounds or driving a car x10 

days.  No intercourse or tampons until seen back.)


Driving: Do Not Drive


Showering/Bathing: May Shower


Wound/Incision Care: Keep Operative Site/Wound Site Clean and Dry


Notify Provider of: Fever, Increased Pain, Swelling and Redness, Nausea and/or 

Vomiting





- Discharge Plan


Home Medications: 


                                    Home Meds





Albuterol Sulfate [Albuterol Sulfate HFA] 1 puff INH ASDIRECTED PRN 21 

[History]


Ferrous Sulfate [Iron] 325 mg PO DAILY 21 [History]


Fluticasone Propion/Salmeterol [Advair 250-50 Diskus] 1 spray NASBOTH DAILY 

21 [History]


Montelukast Sodium 10 mg PO DAILY 21 [History]


Prenatal No122/Iron/Folic Acid [Prenatal Multi Tablet] 1 tab PO DAILY 21 

[History]


Acetaminophen/oxyCODONE [Percocet 325-5 MG] 1 tab PO Q4H PRN  tablet 21 

[Rx]


Acetaminophen/oxyCODONE [Percocet 325-5 MG] 2 tab PO Q4H PRN  tablet 21 

[Rx]


Docusate Sodium [Colace] 100 mg PO Q12H  cap 21 [Rx]


Ibuprofen [Motrin] 800 mg PO Q8H  tablet 21 [Rx]








Patient Handouts:  Suppressing Your Milk Supply,  Delivery, Care After, 

Iron-Rich Diet


Referrals: 


Krish Angel MD [Primary Care Provider] -  (Follow up with Dr. Angel in two

weeks.)





- Discharge Summary/Plan Comment


DC Time >30 min.: No


Discharge Summary/Plan Comment: 








Discharge instructions:





1. Discharge home





2. Diet, activity and follow-up discussed with patient. Recommend nursing diet 

with increased calories and calcium.





3. Precautions given concern increased pain, bleeding, temperature, signs/

symptoms of DVT/PE.





4. Medications per home medication was printed, discussed with and given to the 

patient.





5. Return to clinic-Dr. Angel[facility] in 2 weeks.





Diagnosis:





1.  Term pregnancy-delivered





2.  Failure to progress with resultant 





3.  Anemia secondary to pregnancy and acute blood loss at the time of surgery 

and in the immediate postpartum period





Condition: Good





- Patient Data


Vitals - Most Recent: 


                                Last Vital Signs











Temp  36.4 C   21 08:07


 


Pulse  75   21 08:07


 


Resp  16   21 08:07


 


BP  108/67   21 08:07


 


Pulse Ox  96   21 08:07











Weight - Most Recent: 123.559 kg


I&O - Last 24 hours: 


                                 Intake & Output











 06/15/21 06/16/21 06/16/21





 22:59 06:59 14:59


 


Intake Total 0  120


 


Balance 0  120











Med Orders - Current: 


                               Current Medications








Discontinued Medications





Acetaminophen (Acetaminophen 325 Mg Tab)  650 mg PO Q4H PRN


   PRN Reason: Pain (Mild 1-3) and fever


Bupivacaine HCl (Bupivacaine 0.5% 30 Ml Sdv) Confirm Administered Dose 30 ml 

.ROUTE .STK-MED ONE


   Stop: 21 06:00


   Last Admin: 21 06:19 Dose:  20 ml


   Documented by: 


Calcium Carbonate/Glycine (Calcium Carbonate 500 Mg Tab.Chew)  1,000 mg PO Q2H 

PRN


   PRN Reason: Indigestion


   Last Admin: 21 01:22 Dose:  1,000 mg


   Documented by: 


Cefazolin Sodium (Cefazolin 1 Gm Vial) Confirm Administered Dose 3 gm .ROUTE 

.STK-MED ONE


   Stop: 21 05:47


Citric Acid/Sodium Citrate (Citric Acid/Sodium Citrate Solution 30 Ml Cup) 

Confirm Administered Dose 30 ml .ROUTE .STK-MED ONE


   Stop: 21 05:44


   Last Admin: 21 13:58 Dose:  Not Given


   Documented by: 


Citric Acid/Sodium Citrate (Citric Acid/Sodium Citrate Solution 30 Ml Cup)  30 

ml PO ONETIME ONE


   Stop: 21 05:35


   Last Admin: 21 05:55 Dose:  30 ml


   Documented by: 


Dexamethasone (Dexamethasone 4 Mg/Ml 5 Ml Mdv) Confirm Administered Dose 20 mg 

.ROUTE .STK-MED ONE


   Stop: 21 06:36


Diphenhydramine HCl (Diphenhydramine 50 Mg/Ml Sdv)  25 mg IVPUSH Q6H PRN


   PRN Reason: pruritis


   Last Admin: 21 00:59 Dose:  25 mg


   Documented by: 


Diphenhydramine HCl (Diphenhydramine 50 Mg/Ml Sdv)  25 mg IVPUSH Q6H PRN


   PRN Reason: Pruritis


Diphenhydramine HCl (Diphenhydramine 50 Mg/Ml Sdv)  25 mg IVPUSH Q6H PRN


   PRN Reason: Itching or Nausea


   Last Admin: 21 23:26 Dose:  25 mg


   Documented by: 


Docusate Sodium (Docusate Sodium 100 Mg Cap)  100 mg PO Q12H LOIS


   Last Admin: 21 10:42 Dose:  Not Given


   Documented by: 


Ephedrine Sulfate (Ephedrine 50 Mg/Ml Sdv)  5 mg IVPUSH ASDIRECTED PRN


   PRN Reason: Hypotension


   Last Admin: 21 17:32 Dose:  10 mg


   Documented by: 


Ephedrine Sulfate (Ephedrine 50 Mg/Ml Sdv)  5 mg IVPUSH SEECOMMENT PRN


   PRN Reason: Other


Fentanyl (Fentanyl 100 Mcg/2 Ml Sdv)  100 mcg EPIDUR Q3H PRN


   PRN Reason: Pain


   Last Admin: 21 16:00 Dose:  100 mcg


   Documented by: 


Fentanyl (Fentanyl 100 Mcg/2 Ml Sdv) Confirm Administered Dose 100 mcg .ROUTE 

.[facility]-MED ONE


   Stop: 21 05:51


Fentanyl (Fentanyl 100 Mcg/2 Ml Sdv)  50 mcg IVPUSH Q5M PRN


   PRN Reason: Pain


Fentanyl/Bupivacaine HCl (Bupivacaine/Fentanyl/Ns 100 Ml Bag)  100 ml EPIDUR 

ASDIRECTED PRN


   PRN Reason: Pain


   Last Admin: 21 02:17 Dose:  100 ml


   Documented by: 


Oxytocin/Lactated Ringer's (Pitocin In Lr 10 Units/1,000 Ml)  10 unit in 1,000 

mls @ 12 mls/hr IV TITRATE LOIS; Protocol


   Last Titration: 21 17:59 Dose:  30 munits/min, 180 mls/hr


   Documented by: 


Oxytocin/Lactated Ringer's (Pitocin In Lr 10 Units/1,000 Ml)  10 unit in 1,000 

mls @ 100 mls/hr IV .CONTINUOUS LOIS; Protocol


Lactated Ringer's (Ringers, Lactated)  1,000 mls @ 100 mls/hr IV ASDIRECTED LOIS


   Last Admin: 21 05:54 Dose:  100 mls/hr


   Documented by: 


Oxytocin/Lactated Ringer's (Pitocin In Lr 20 Units/1,000 Ml)  20 unit in 1,000 

mls @ 90 mls/hr IV TITRATE LOIS; Protocol


   Last Admin: 21 18:36 Dose:  90 mls/hr


   Documented by: 


Oxytocin/Lactated Ringer's (Pitocin In Lr 20 Units/1,000 Ml)  20 unit in 1,000 

mls @ 6 mls/hr IV TITRATE LOIS; Protocol


   Last Admin: 21 20:25 Dose:  90 mls/hr


   Documented by: 


Cefazolin Sodium/Dextrose 2 gm (/ Premix)  50 mls @ 100 mls/hr IV ONETIME ONE


   Stop: 21 06:03


   Last Admin: 21 13:57 Dose:  Not Given


   Documented by: 


Cefazolin Sodium/Dextrose 1 gm (/ Premix)  50 mls @ 100 mls/hr IV ONETIME ONE


   Stop: 21 06:03


   Last Admin: 21 13:57 Dose:  Not Given


   Documented by: 


Azithromycin 500 mg/ Sodium (Chloride)  250 mls @ 250 mls/hr IV ONETIME STA


   Stop: 21 06:33


   Last Admin: 21 05:57 Dose:  250 mls/hr


   Documented by: 


Lactated Ringer's (Ringers, Lactated) Confirm Administered Dose 1,000 mls @ as 

directed .ROUTE .STK-MED ONE


   Stop: 21 06:43


Lactated Ringer's (Ringers, Lactated) Confirm Administered Dose 1,000 mls @ as 

directed .ROUTE .STK-MED ONE


   Stop: 21 06:43


Dextrose/Lactated Ringer's (Dextrose 5%-Lactated Ringers)  1,000 mls @ 125 

mls/hr IV ASDIRECTED LOIS


   Stop: 21 18:26


   Last Admin: 21 18:06 Dose:  125 mls/hr


   Documented by: 


Sodium Chloride (Normal Saline)  1,000 mls @ 999 mls/hr IV ONETIME ONE


   Stop: 21 11:55


   Last Admin: 21 11:01 Dose:  999 mls/hr


   Documented by: 


Sodium Chloride (Normal Saline) Confirm Administered Dose 1,000 mls @ as 

directed .ROUTE .STK-MED ONE


   Stop: 21 10:59


   Last Admin: 21 13:57 Dose:  Not Given


   Documented by: 


Ibuprofen (Ibuprofen 800 Mg Tab)  800 mg PO Q8H [facility]


   Last Admin: 21 21:52 Dose:  800 mg


   Documented by: 


Ibuprofen (Ibuprofen 800 Mg Tab)  800 mg PO Q8H [facility]


   Last Admin: 21 06:06 Dose:  800 mg


   Documented by: 


Ketorolac Tromethamine (Ketorolac 30 Mg/Ml Sdv) Confirm Administered Dose 30 mg 

.ROUTE .STK-MED ONE


   Stop: 21 05:47


Lidocaine HCl (Lidocaine 1% 50 Ml Mdv)  50 ml INJECT ONETIME [facility]


Lidocaine/Epinephrine (Lidocaine 2% With Epinephrine 1:200,000 20 Ml Sdv) 

Confirm Administered Dose 20 ml .ROUTE .STK-MED ONE


   Stop: 21 05:47


Methylergonovine Maleate (Methylergonovine 0.2 Mg/1 Ml Amp) Confirm Administered

Dose 0.2 mg .ROUTE .STK-MED ONE


   Stop: 21 06:34


   Last Admin: 21 07:38 Dose:  0.2 mg


   Documented by: 


Methylergonovine Maleate (Methylergonovine 0.2 Mg/1 Ml Amp)  0.2 mg IM NOW STA


   Stop: 21 09:16


   Last Admin: 21 09:28 Dose:  0.2 mg


   Documented by: 


Methylergonovine Maleate (Methylergonovine 0.2 Mg/1 Ml Amp) Confirm Administered

Dose 0.2 mg .ROUTE .STK-MED ONE


   Stop: 21 09:17


   Last Admin: 21 13:56 Dose:  Not Given


   Documented by: 


Methylergonovine Maleate (Methylergonovine 0.2 Mg/1 Ml Amp)  0.2 mg IM NOW STA


   Stop: 21 14:46


   Last Admin: 21 15:01 Dose:  0.2 mg


   Documented by: 


Metoclopramide HCl (Metoclopramide 10 Mg/2 Ml Sdv) Confirm Administered Dose 10 

mg .ROUTE .STK-MED ONE


   Stop: 21 05:44


   Last Admin: 21 13:58 Dose:  Not Given


   Documented by: 


Metoclopramide HCl (Metoclopramide 10 Mg/2 Ml Sdv)  10 mg IVPUSH ONETIME ONE


   Stop: 21 05:35


   Last Admin: 21 05:55 Dose:  10 mg


   Documented by: 


Miscellaneous Medication (Phenylephrine Hcl In 0.9% Nacl 1 Mg/10 Ml Syringe) 

Confirm Administered Dose 1 mg .ROUTE .STK-MED ONE


   Stop: 21 06:36


Misoprostol (Misoprostol 25 Mcg (1/4 Of 100 Mcg) Tab)  25 mcg VAG ONETIME ONE


   Stop: 21 01:01


   Last Admin: 21 01:14 Dose:  25 mcg


   Documented by: 


Misoprostol (Misoprostol 25 Mcg (1/4 Of 100 Mcg) Tab)  50 mcg VAG ONETIME ONE


   Stop: 21 04:01


   Last Admin: 21 04:15 Dose:  50 mcg


   Documented by: 


Misoprostol (Misoprostol 200 Mcg Tab)  400 mcg PO ONETIME ONE


   Stop: 21 08:34


   Last Admin: 21 08:38 Dose:  400 mcg


   Documented by: 


Misoprostol (Misoprostol 200 Mcg Tab) Confirm Administered Dose 400 mcg .ROUTE 

.STK-MED ONE


   Stop: 21 08:32


   Last Admin: 21 13:56 Dose:  Not Given


   Documented by: 


Misoprostol (Misoprostol 200 Mcg Tab)  600 mcg PO Q6H [facility]


   Last Admin: 21 14:36 Dose:  600 mcg


   Documented by: 


Misoprostol (Misoprostol 200 Mcg Tab)  600 mcg PO BID LOIS


Misoprostol (Misoprostol 200 Mcg Tab)  600 mcg PO Q4HR [facility]


   Last Admin: 21 19:18 Dose:  600 mcg


   Documented by: 


Misoprostol (Misoprostol 200 Mcg Tab)  600 mcg PO Q4H [facility]


   Last Admin: 06/15/21 03:07 Dose:  600 mcg


   Documented by: 


Morphine Sulfate (Morphine Pf 10 Mg/10 Ml Sdv) Confirm Administered Dose 10 mg 

.ROUTE .STK-MED ONE


   Stop: 21 06:46


Nalbuphine HCl (Nalbuphine 10 Mg/1 Ml Vial)  10 mg IVPUSH Q2H PRN


   PRN Reason: Pain


Naloxone HCl (Naloxone 0.4 Mg/Ml Sdv)  0.1 mg IVPUSH SEECOMMENT PRN


   PRN Reason: Respiratory Depression


Ondansetron HCl (Ondansetron 4 Mg/2 Ml Sdv)  4 mg IVPUSH Q4H PRN


   PRN Reason: Nausea/Vomiting


   Last Admin: 21 17:45 Dose:  4 mg


   Documented by: 


Ondansetron HCl (Ondansetron 4 Mg/2 Ml Sdv) Confirm Administered Dose 4 mg 

.ROUTE .STK-MED ONE


   Stop: 21 05:47


Ondansetron HCl (Ondansetron 4 Mg/2 Ml Sdv)  4 mg IVPUSH ONETIME PRN


   PRN Reason: Nausea/Vomiting


Ondansetron HCl (Ondansetron 4 Mg/2 Ml Sdv)  4 mg IV Q4H PRN


   PRN Reason: Nausea/Vomiting


Oxycodone/Acetaminophen (Acetaminophen/Oxycodone 325-5 Mg Tab)  2 tab PO Q4H PRN


   PRN Reason: Pain (severe 7-10)


   Last Admin: 21 01:20 Dose:  2 tab


   Documented by: 


Oxycodone/Acetaminophen (Acetaminophen/Oxycodone 325-5 Mg Tab)  1 tab PO Q4H PRN


   PRN Reason: Pain (moderate 4-6)


   Last Admin: 21 11:23 Dose:  1 tab


   Documented by: 


Oxytocin (Oxytocin 10 Units/1 Ml Sdv) Confirm Administered Dose 10 unit .ROUTE 

.STK-MED ONE


   Stop: 21 05:47


Oxytocin (Oxytocin 10 Units/1 Ml Sdv) Confirm Administered Dose 10 unit .ROUTE 

.STK-MED ONE


   Stop: 21 05:50


Prenat Multivit//Iron/Folic Ac (Prenatal Multivitamin With Calcium/Folic 

Acid/Iron Tab)  1 each PO DAILY [facility]


   Last Admin: 21 09:46 Dose:  Not Given


   Documented by: 


Simethicone (Simethicone 80 Mg Tab.Chew)  160 mg PO QID [facility]


   Last Admin: 21 08:04 Dose:  160 mg


   Documented by: 


Sodium Chloride (Sodium Chloride 0.9% 10 Ml Syringe)  10 ml FLUSH ASDIRECTED PRN


   PRN Reason: Keep Vein Open


Tranexamic Acid (Tranexamic Acid 1,000 Mg/10 Ml Amp)  1,000 mg IVPUSH ONETIME 

ONE


   Stop: 21 09:15


   Last Admin: 21 09:34 Dose:  1,000 mg


   Documented by: 


Tranexamic Acid (Tranexamic Acid 1,000 Mg/10 Ml Amp) Confirm Administered Dose 

1,000 mg .ROUTE .STK-MED ONE


   Stop: 21 09:16


   Last Admin: 21 13:56 Dose:  Not Given


   Documented by: